# Patient Record
(demographics unavailable — no encounter records)

---

## 2025-01-03 NOTE — DISCUSSION/SUMMARY
[FreeTextEntry1] : The visit was provided via telehealth using real-time 2-way audio visual technology. The patient, Prachi Garduno, was located at home, [place], [place], at the time of the visit. The Genetic Counselor, Maliha Mejia, was located at the medical office located in [place], [place]. The patient and the Genetic Counselor both participated in the telehealth encounter. Consent for telehealth services was given on 2025 by the patient, Prachi Garduno.  REASON FOR CONSULT Prachi Garduno is a 39-year-old female who was referred by Dr. Neva Stanley for cancer genetic counseling and risk assessment due to family history of cancer.   RELEVANT MEDICAL HISTORY Ms. Garduno is a healthy individual who has never had cancer. She has a family history of cancer, see below.  OTHER MEDICAL AND SURGICAL HISTORY: -	Medical History: None.  -	Surgical History: Knee surgery, LEEP procedure at age 25.   PAST OB/GYN HISTORY: Height: 5'6" Weight: 155 lbs Obstetrical History:  Age at Menarche: 14 Premenopausal  Age at First Live Birth: N/A Oral Contraceptive Use: Yes, former use, 23 years, stopped a year ago. Hormone Replacement Therapy: No.   CANCER SCREENING HISTORY:   Breast:  -	Mammography: None.  -	Sonography: None.  -	MRI: None.  -	Biopsies: None.  GYN: -	Pelvic Examination & Pap Smear: most recent in 2024: Negative; Frequency: Annual Colon: -	Colonoscopy: None.  -	Upper Endoscopy: most recent reported in : Negative; Frequency: None.  Skin:   -	FBSE: None.  -	Lesions biopsied/removed: most recent reported in : Chest lesion: Benign.   SOCIAL HISTORY: -	Occupation: Technical Design.  -	Tobacco-product use: No.  -	Environmental exposures (like asbestos/toxic chemicals/radiation): Yes, tanning bed use, 2 years, in high school.   FAMILY HISTORY: Maternal ancestry and paternal ancestry were reported as [place], [place], [place]ese. Ashkenazi Catholic ancestry was denied/ascertained. A detailed family history of cancer was ascertained. Relevant diagnoses are detailed below and in the scanned pedigree.   To Ms. Garduno's knowledge, no one in the family has had germline testing for cancer susceptibility.   	 RISK ASSESSMENT: Ms. Garduno's family history of pancreatic cancer is suggestive of an inherited predisposition to pancreatic and related cancers.   We recommended genetic testing for genes associated with pancreatic, breast and gynecological cancer. This test analyzes 25 genes: APC, MIREYA, BARD1, BRCA1, BRCA2, BRIP1, CDH1, CDKN2A, CHEK2, EPCAM, MEN1, MLH1, MSH2, MSH6, NF1, PALB2, PMS2, PTEN, RAD51C, RAD51D, STK11, TP53, TSC1, TSC2, VHL.   We discussed the risks, benefits and limitations, and implications of genetic testing. We also discussed the psychosocial implications of genetic testing. Possible test results were reviewed with Ms. Garduno, along with associated medical management options. The Genetic Information Non-discrimination Act (JUSTO) was also reviewed.   Ms. Garduno verbally consented to the above-mentioned genetic testing panel. Informed consent form was emailed to the patient, and a saliva sample kit will be mailed to the patient. Once the sample has been collected and sent out, Ms. Garduno will inform us.   PLAN 1.	Saliva kit was sent to Ms. Garduno's home for collection. Once collected and dropped off, patient will inform us.  2.	Ms. Garduno was sent a copy of the informed consent form via email to be filled, signed, and returned to us. 3.	We will contact Ms. Garduno once the results are available and will schedule a follow-up appointment, as needed. Results generally return in 2-3 weeks from the day the sample is received in the lab.  For any additional questions please call [facility] at [phone].   Maliha Mejia MS, [facility] Genetic Counselor, Cancer Genetics  CC:  Dr. Neva Stanley

## 2025-02-12 NOTE — DISCUSSION/SUMMARY
[FreeTextEntry1] : RESULTS TRANSMISSION Prachi Garduno is a 39-year-old female who was called on 02/12/2025 for a discussion regarding his genetic testing results related to hereditary cancer predisposition.   Ms. Garduno was originally seen at [facility] on 01/03/2025 for hereditary cancer predisposition risk assessment due to family history of cancer. Ms. Garduno decided to pursue genetic testing using [facility] panel (Breast & Gyn, Pancreatic panel, 25 genes)  TEST RESULTS: NEGATIVE  No pathogenic (disease-causing) variants or VUSs were detected in the following genes: APC, MIREYA, BARD1, BRCA1, BRCA2, BRIP1, CDH1, CDKN2A, CHEK2, EPCAM, MEN1, MLH1, MSH2, MSH6, NF1, PALB2, PMS2, PTEN, RAD51C, RAD51D, STK11, TP53, TSC1, TSC2, VHL.  RESULTS INTERPRETATION AND ASSESSMENT: Given Ms. Garduno's personal and current reported family history of cancer, and her negative genetic test results, the following screening guidelines and risk-reducing recommendations were discussed:  BREAST: In the absence of other indications, Ms. Garduno should practice age-appropriate breast cancer screening as recommended for the general population.  OTHER:  In the absence of other indications, Ms. Garduno should practice age-appropriate cancer screening of other organ systems as recommended for the general population.  We also discussed that, while no cause of the patient's personal and family history of cancer was identified, this result, while reassuring, does entirely not rule out a hereditary cancer risk in the patient. It is possible, although unlikely, the patient has a mutation in one of the genes tested that is not detectable by this analysis, or has a mutation in a different gene, either known or unknown. It is also possible there is a hereditary cancer predisposition in the family, but the patient did not inherit it.  We informed Ms. Garduno that our knowledge of genetics and inherited cancer conditions is changing rapidly. Therefore, we recommended that Ms. Garduno contact our office, every 2 to 3 years, to discuss relevant advances in cancer genetics.  We emphasized the importance of re-contacting us with updates regarding her personal and family history of cancer as well as any updates regarding additional cancer genetic test results performed for the patient and/or family members.  Such updates could possibly change our risk assessment and recommendations.   In addition, we discussed Ms. Garduno's mother, sister could consider pursuing cancer risk assessment genetic counseling with the option of genetic testing.   PLAN: 1.See above for recommended screening and risk-reduction strategies. 2. Patient informed consult note will be available through their [employer] patient portal and genetic test results will be released via [facility] laboratory portal.  3. Ms. Garduno was encouraged to contact us every 2-3 years to discuss relevant advances in cancer genetics, or sooner if there are any changes in her personal or family history of cancer.  For any additional questions please call Cancer Genetics at [phone].   Maliha Mejia MS, [facility] Genetic Counselor, Cancer Genetics  CC:  Patient Dr. Neva Stanley